# Patient Record
Sex: FEMALE | Race: WHITE | NOT HISPANIC OR LATINO | Employment: FULL TIME | URBAN - METROPOLITAN AREA
[De-identification: names, ages, dates, MRNs, and addresses within clinical notes are randomized per-mention and may not be internally consistent; named-entity substitution may affect disease eponyms.]

---
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
## 2025-01-17 NOTE — TELEPHONE ENCOUNTER
----- Message from Shania Toney NP sent at 6/11/2020 12:30 PM CDT -----  Please call patient to tell her I got her lab results and will call out an additional antibiotic for her to take  
Informed patient that a prescription was called out for her and she is to take all of it. KOTA Toney NP reviewed her labs. States understanding.  
</= 50% for >/= 1 month